# Patient Record
Sex: MALE
[De-identification: names, ages, dates, MRNs, and addresses within clinical notes are randomized per-mention and may not be internally consistent; named-entity substitution may affect disease eponyms.]

---

## 2018-09-27 ENCOUNTER — HOSPITAL ENCOUNTER (OUTPATIENT)
Dept: HOSPITAL 5 - OR | Age: 53
Discharge: HOME | End: 2018-09-27
Attending: UROLOGY
Payer: COMMERCIAL

## 2018-09-27 VITALS — SYSTOLIC BLOOD PRESSURE: 130 MMHG | DIASTOLIC BLOOD PRESSURE: 87 MMHG

## 2018-09-27 DIAGNOSIS — I10: ICD-10-CM

## 2018-09-27 DIAGNOSIS — G47.30: ICD-10-CM

## 2018-09-27 DIAGNOSIS — Z87.891: ICD-10-CM

## 2018-09-27 DIAGNOSIS — Z79.899: ICD-10-CM

## 2018-09-27 DIAGNOSIS — Z79.01: ICD-10-CM

## 2018-09-27 DIAGNOSIS — J45.909: ICD-10-CM

## 2018-09-27 DIAGNOSIS — N20.2: Primary | ICD-10-CM

## 2018-09-27 PROCEDURE — 50590 FRAGMENTING OF KIDNEY STONE: CPT

## 2018-09-27 NOTE — DISCHARGE SUMMARY
Short Stay Discharge Plan


Activity: other (no straining )


Weight Bearing Status: Full Weight Bearing


Diet: low fat, low salt


Special Instructions: other (inc fluids )


Follow up with: 


ANTHONY PARSON [Other] - 7 Days


RUBEN MAHONEY MD [Staff Physician] - 7 Days

## 2018-09-27 NOTE — OPERATIVE REPORT
PREOPERATIVE DIAGNOSIS:  Right ureteropelvic junction stone transposed into the

kidney.



POSTOPERATIVE DIAGNOSIS:  Right ureteropelvic junction stone transposed into the

kidney.



PROCEDURE:  Right lithotripsy post-stenting.



SURGEON:  Barry Welch MD



ANESTHESIA:  General.



FINDINGS:  This is a gentleman, who had a stent with possible pending urosepsis

and now presents for treatment with ESWL.



DESCRIPTION OF PROCEDURE:  The patient was brought to lithotripsy and placed on

the table.  Following induction of anesthesia, he was placed in supine position.

 Stone was located both the AP and oblique image.  Following the induction of

anesthesia, shocks were begun at 1 kV, increased to maximum of 7 kV.  There was

excellent fragmentation of the stone.  The patient tolerated the procedure well.

 He is still based on the biggest piece may need further procedures, but we had

good fragmentation, tolerated the procedure well and brought to recovery in

stable condition.





DD: 09/27/2018 12:10

DT: 09/27/2018 15:19

JOB# [id number]  [id number]

EMMANUEL/CANDY

## 2018-09-27 NOTE — POST OPERATIVE NOTE
Date of procedure: 09/27/18


Pre-op diagnosis: renal stones 


Post-op diagnosis: same


Findings: 





large upj stone


Procedure: 





r eswl


Anesthesia: RADHA


Surgeon: JARRED ESPINOZA


Estimated blood loss: none


Pathology: none


Condition: stable


Disposition: PACU

## 2018-09-27 NOTE — ANESTHESIA CONSULTATION
Anesthesia Consult and Med Hx


Date of service: 09/27/18





- Airway


Anesthetic Teeth Evaluation: Good


ROM Head & Neck: Adequate


Mental/Hyoid Distance: Adequate


Mallampati Class: Class II


Intubation Access Assessment: Good





- Pulmonary Exam


CTA: Yes





- Cardiac Exam


Cardiac Exam: No Murmur





- Pre-Operative Health Status


ASA Pre-Surgery Classification: ASA2





- Pulmonary


Hx Smoking: Yes (CHEWING TOBACCO- STOPPED 2003)


Hx Asthma: Yes (AS CHILD ONLY)


Hx Sleep Apnea: Yes (DX SLEEP APNEA , NO CPAP USE.)





- Cardiovascular System


Hx Hypertension: Yes (RECENT DX- ON MEDS X 1 WEEK)





- Other Systems


Hx Cancer: No